# Patient Record
Sex: FEMALE | Race: WHITE
[De-identification: names, ages, dates, MRNs, and addresses within clinical notes are randomized per-mention and may not be internally consistent; named-entity substitution may affect disease eponyms.]

---

## 2018-12-10 ENCOUNTER — HOSPITAL ENCOUNTER (EMERGENCY)
Dept: HOSPITAL 25 - UCEAST | Age: 33
Discharge: HOME | End: 2018-12-10
Payer: COMMERCIAL

## 2018-12-10 VITALS — SYSTOLIC BLOOD PRESSURE: 137 MMHG | DIASTOLIC BLOOD PRESSURE: 77 MMHG

## 2018-12-10 DIAGNOSIS — Z88.1: ICD-10-CM

## 2018-12-10 DIAGNOSIS — B96.20: ICD-10-CM

## 2018-12-10 DIAGNOSIS — N39.0: Primary | ICD-10-CM

## 2018-12-10 PROCEDURE — 81003 URINALYSIS AUTO W/O SCOPE: CPT

## 2018-12-10 PROCEDURE — G0463 HOSPITAL OUTPT CLINIC VISIT: HCPCS

## 2018-12-10 PROCEDURE — 87077 CULTURE AEROBIC IDENTIFY: CPT

## 2018-12-10 PROCEDURE — 87186 SC STD MICRODIL/AGAR DIL: CPT

## 2018-12-10 PROCEDURE — 84702 CHORIONIC GONADOTROPIN TEST: CPT

## 2018-12-10 PROCEDURE — 74176 CT ABD & PELVIS W/O CONTRAST: CPT

## 2018-12-10 PROCEDURE — 87086 URINE CULTURE/COLONY COUNT: CPT

## 2018-12-10 PROCEDURE — 99212 OFFICE O/P EST SF 10 MIN: CPT

## 2018-12-10 NOTE — UC
Abdominal Pain Female HPI





- HPI Summary


HPI Summary: 





33 f with c/o abdominal pain, painful urination, buring with urination, L lower 

back pain x 2-3 days.  no fever, chills.  1 year of frequent urination, x 10  a 

night.   h/o catheterizatins as a child- age 4, unsure for what reason.   no 

recent UTIs/ bladder/ kidney problems.  + hematuria x 1  





- History of Current Complaint


Chief Complaint: UCGeneralIllness


Stated Complaint: URINARY COMPLAINT


Time Seen by Provider: 12/10/18 08:22


Hx Obtained From: Patient


Hx Last Menstrual Period: ended 12/4/18


Pregnant?: No


Onset/Duration: Sudden Onset, Lasting Days


Timing: Constant


Severity Initially: Moderate


Severity Currently: Moderate


Pain Intensity: 5


Pain Scale Used: 0-10 Numeric


Location: Diffuse - lower abdomen


Allergies/Adverse Reactions: 


 Allergies











Allergy/AdvReac Type Severity Reaction Status Date / Time


 


clarithromycin [From Biaxin] AdvReac  Abdominal Verified 12/10/18 08:42





   Pain  














PMH/Surg Hx/FS Hx/Imm Hx


Previously Healthy: Yes - ? catheterizations as a child 





- Surgical History


Surgical History: Yes


Surgery Procedure, Year, and Place: LEEP





- Family History


Known Family History: Positive: Cardiac Disease





- Social History


Alcohol Use: Occasionally


Substance Use Type: None


Smoking Status (MU): Never Smoked Tobacco


Have You Smoked in the Last Year: No





- Immunization History


Most Recent Tetanus Shot: UTD





Review of Systems


All Other Systems Reviewed And Are Negative: Yes


Constitutional: Positive: Fatigue


Genitourinary: Positive: Dysuria, Hematuria, Frequency, Urgency, Vaginal/Penile 

Burning





Physical Exam


Triage Information Reviewed: Yes


Appearance: Well-Appearing, No Pain Distress, Well-Nourished


Vital Signs: 


 Initial Vital Signs











Temp  98.7 F   12/10/18 08:38


 


Pulse  78   12/10/18 08:38


 


Resp  21   12/10/18 08:38


 


BP  137/77   12/10/18 08:38


 


Pulse Ox  98   12/10/18 08:38











Vital Signs Reviewed: Yes


Eyes: Positive: Conjunctiva Clear


Abdomen Description: Negative: CVA Tenderness (R), CVA Tenderness (L)


Musculoskeletal: Positive: Strength Intact, ROM Intact


Neurological Exam: Normal


Psychological Exam: Normal


Skin Exam: Normal





Abd Pain Female Course/Dx





- Course


Course Of Treatment: CT- neg for stone, UT + neg pregnancy, abx given.  follow 

up with PCP  , symptoms improved after NSAIDs, pyrdium





- Differential Dx/Diagnosis


Provider Diagnosis: 


 UTI (urinary tract infection)








Discharge





- Sign-Out/Discharge


Documenting (check all that apply): Patient Departure


All imaging exams completed and their final reports reviewed: Yes





- Discharge Plan


Condition: Good


Disposition: HOME


Prescriptions: 


Ciprofloxacin TAB* [Cipro 500 MG TAB*] 500 mg PO BID #10 tab


Phenazopyridine TAB* [Pyridium 100 mg TAB*] 100 mg PO TID PRN #15 tab


 PRN Reason: urinary pain, spasm 


Patient Education Materials:  Urinary Tract Infection in Women (DC)


Forms:  *Work Release


Referrals: 


Doretha Alex MD [Primary Care Provider] - 


Additional Instructions: 


- Increase fluid intake 


- Pyridium for pain 


- Ibuprofen for pain 


- Go to ER with Back pain, fever 


- Bactrim for antibiotics for 5 days 


- Follow up with Dr Alex for sytmpoms, repeat urine testing in 1-2 weeks 








- Billing Disposition and Condition


Condition: GOOD


Disposition: Home

## 2018-12-12 NOTE — UC
- Progress Note


Progress Note: 





12/12/2018 


Urine culture positive for E.coli.


Pt Rx Ciprofloxacin PO


Final reports shows sensitivity to Ciprofloxacin PO


No change


Isi Christian PA-C  





Course/Dx





- Diagnoses


Provider Diagnoses: 


 UTI (urinary tract infection)








Discharge





- Sign-Out/Discharge


Documenting (check all that apply): Patient Departure - d/c home


All imaging exams completed and their final reports reviewed: Yes





- Discharge Plan


Condition: Good


Disposition: HOME


Prescriptions: 


Ciprofloxacin TAB* [Cipro 500 MG TAB*] 500 mg PO BID #10 tab


Phenazopyridine TAB* [Pyridium 100 mg TAB*] 100 mg PO TID PRN #15 tab


 PRN Reason: urinary pain, spasm 


Patient Education Materials:  Urinary Tract Infection in Women (DC)


Forms:  *Work Release


Referrals: 


Doretha Alex MD [Primary Care Provider] - 


Additional Instructions: 


- Increase fluid intake 


- Pyridium for pain 


- Ibuprofen for pain 


- Go to ER with Back pain, fever 


- Bactrim for antibiotics for 5 days 


- Follow up with Dr Alex for sytmpoms, repeat urine testing in 1-2 weeks 








- Billing Disposition and Condition


Condition: GOOD


Disposition: Home

## 2019-07-17 ENCOUNTER — HOSPITAL ENCOUNTER (EMERGENCY)
Dept: HOSPITAL 25 - UCCORT | Age: 34
Discharge: HOME | End: 2019-07-17
Payer: COMMERCIAL

## 2019-07-17 VITALS — DIASTOLIC BLOOD PRESSURE: 73 MMHG | SYSTOLIC BLOOD PRESSURE: 134 MMHG

## 2019-07-17 DIAGNOSIS — M54.16: Primary | ICD-10-CM

## 2019-07-17 PROCEDURE — 81003 URINALYSIS AUTO W/O SCOPE: CPT

## 2019-07-17 PROCEDURE — 96372 THER/PROPH/DIAG INJ SC/IM: CPT

## 2019-07-17 PROCEDURE — G0463 HOSPITAL OUTPT CLINIC VISIT: HCPCS

## 2019-07-17 PROCEDURE — 99211 OFF/OP EST MAY X REQ PHY/QHP: CPT

## 2019-07-17 NOTE — UC
Back Pain HPI





- HPI Summary


HPI Summary: 


34-year-old woman comes in with a chief complaint of low back pain.  She's been 

having chronic back pain for several months.  The worst in her low back and 

radiates around the left towards the left upper leg.  She is being evaluated by 

her primary care physician and she just had an MRI on July 12, 2019.  At this 

stage there looking at pain clinic and neurosurgical referral.  Pain is worse 

with twisting turning bending.  Patient this morning took ibuprofen and 

acetaminophen and tramadol with minimal relief of the pain.  Usually the same 

medicines to help with the pain.  Denies any burning with urination or feeling 

sick with fevers.  Ever since the back pain started patient's noticed that she 

has to urinate more frequently and has been more difficult time controlling the 

urine.  Patient has seen a specialist entertaining the idea of interstitial 

cystitis. No difficulty controlling stool.  She does describe when the pains is 

worse she gets a numb feeling going from her back to her left upper leg.





- History of Current Complaint


Chief Complaint: UCBackPain


Stated Complaint: BACK PAIN


Time Seen by Provider: 07/17/19 15:22


Hx Last Menstrual Period: 7/16/19


Pain Intensity: 9





- Allergies/Home Medications


Allergies/Adverse Reactions: 


 Allergies











Allergy/AdvReac Type Severity Reaction Status Date / Time


 


clarithromycin [From Biaxin] AdvReac  Abdominal Verified 07/17/19 15:39





   Pain  











Home Medications: 


 Home Medications





Acetaminophen [Tylenol Extra Strength] 1,000 mg PO DAILY 07/17/19 [History 

Confirmed 07/17/19]


Cholecalciferol (Vitamin D3) [Vitamin D3] 1,000 unit PO DAILY 07/17/19 [History 

Confirmed 07/17/19]


Cyanocobalamin TAB* [Vitamin B12 TAB*] 500 mcg PO DAILY 07/17/19 [History 

Confirmed 07/17/19]


LORazepam TAB(*) [Ativan 0.5 MG TAB (*)] 0.5 mg PO BEDTIME PRN 07/17/19 [

History Confirmed 07/17/19]


Lactobacillus Acidophilus [Probiotic] 1 each PO DAILY 07/17/19 [History 

Confirmed 07/17/19]


Uribel BID PRN 07/17/19 [History]


traMADol TAB* [Ultram*] 50 mg PO Q6HR PRN 07/17/19 [History Confirmed 07/17/19]











PMH/Surg Hx/FS Hx/Imm Hx


Previously Healthy: Yes





- Surgical History


Surgical History: Yes


Surgery Procedure, Year, and Place: LEEP;





- Family History


Known Family History: Positive: Cardiac Disease





- Social History


Alcohol Use: Occasionally


Substance Use Type: None


Smoking Status (MU): Never Smoked Tobacco


Have You Smoked in the Last Year: No





- Immunization History


Most Recent Tetanus Shot: UTD





Review of Systems


All Other Systems Reviewed And Are Negative: Yes


Constitutional: Positive: Negative


Skin: Positive: Negative


Eyes: Positive: Negative


ENT: Positive: Negative


Respiratory: Positive: Negative


Cardiovascular: Positive: Negative


Gastrointestinal: Positive: Negative


Genitourinary: Positive: Frequency


Motor: Positive: Other - SEE HPI


Neurovascular: Positive: Other - SEE HPI


Musculoskeletal: Positive: Other: - SEE HPI


Neurological: Positive: Other - SEE HPI


Psychological: Positive: Negative


Is Patient Immunocompromised?: No





Physical Exam


Triage Information Reviewed: Yes


Appearance: Well-Appearing, Well-Nourished, Pain Distress - MILD; WORSE WITH ROM


Vital Signs: 


 Initial Vital Signs











Temp  100.2 F   07/17/19 15:22


 


Pulse  70   07/17/19 15:22


 


Resp  16   07/17/19 15:22


 


BP  134/73   07/17/19 15:22


 


Pulse Ox  100   07/17/19 15:22











Vital Signs Reviewed: Yes


Eye Exam: Normal


Eyes: Positive: Conjunctiva Clear


Neck: Positive: Supple


Respiratory: Positive: No respiratory distress


Musculoskeletal: Positive: Strength Intact, ROM Intact


Neurological: Positive: Alert, Muscle Tone Normal


Psychological: Positive: Normal Response To Family, Age Appropriate Behavior


Skin Exam: Normal





Back Pain Course/Dx





- Course


Course Of Treatment: 


In clinic patient received Toradol 60 mg IM and Norco 5 mg by mouth.  Plan will 

be to follow-up with her primary care doctor.  I let her know that if she had 

any weakness or numbness or difficulty controlling urine or bowel it's changing 

immediately evaluated again right away.  Temperature here was 100.2.  only 

trace blood in the urine no signs of urinary tract infection otherwise.  No 

other signs of infection.  Patient feels well.





- Differential Dx/Diagnosis


Provider Diagnosis: 


 Low back pain, Lumbar radiculopathy








Discharge





- Sign-Out/Discharge


Documenting (check all that apply): Patient Departure


All imaging exams completed and their final reports reviewed: No Studies





- Discharge Plan


Condition: Stable


Disposition: HOME


Prescriptions: 


HYDROcodone/ACETAMIN 5-325 MG* [Norco 5-325 TAB*] 1 tab PO Q4H PRN #20 tab MDD 6


 PRN Reason: Pain


methylPREDNISolone [Medrol Dosepak 4 MG*] 0 mg PO .[place] [employer] #1 [place]


Patient Education Materials:  Lower Back Exercises (ED), Acute Low Back Pain (ED

), Lumbar Radiculopathy (ED)


Referrals: 


Doretha Alex MD [Primary Care Provider] - 


[facility] [Provider Group]


Chris Ding MD [Medical Doctor] - 


Additional Instructions: 


FOLLOW UP WITH YOUR DOCTOR.


FOLLOW UP WITH YOUR PRIMARY CARE DOCTOR, SPORTS MEDICINE, PAIN MEDICINE AND/OR 

NEUROSURGERY.


GET REEVALUATED SOONER IF WORSE OR ANY QUESTIONS OR CONCERNS.








- Billing Disposition and Condition


Condition: STABLE


Disposition: Home

## 2019-07-17 NOTE — XMS REPORT
Continuity of Care Document (CCD)

 Created on:2019



Patient:Cheri Lovell

Sex:Female

:1985

External Reference #:[id number]





Demographics







 Address  [address] [address]

 

 Mobile Phone  [phone]

 

 Email Address  [email or web address]

 

 Preferred Language  en

 

 Marital Status  Not  or 

 

 Taoist Affiliation  Unknown

 

 Race  White

 

 Ethnic Group  Not  or 









Author







 Name  Destini Rivero NP

 

 Address  [address]



   Unavailable



   [place] [address]









Support







 Name  Relationship  Address  Phone

 

 Tien Shoemaker  Significant Other  Unavailable  [phone]









Care Team Providers







 Name  Role  Phone

 

 Doretha Alex M.D.  Care Team Information   Unavailable

 

 Doretha Alex M.D.  Primary Care Physician  Unavailable









Payers







 Date  Identification Numbers  Payment Provider  Subscriber

 

 Effective: 2014  Policy Number: [id number]  [facility]  Cheri [facility]ell









 Group Number: [id number]  [id number]

 

 PayID: [id number]  [place] [address]







Problems







 Active Problems  Provider  Date

 

 Vitamin D deficiency  Anastasiya Morin M.D.  Onset: 2012

 

 Attention deficit hyperactivity disorder,  David Hills M.D.  Onset: 



 predominantly inattentive type    

 

 Mild recurrent major depression  David Hills M.D.  Onset: 2012

 

 Erythroleukoplakia of internal part of  Morales Hernandez M.D.  Onset: 2016



 mouth    

 

 Recurrent major depressive episodes  Doretha Alex M.D.  Onset: 2018

 

 Chronic interstitial cystitis  Doretha Alex M.D.  Onset: 2019









 Resolved Problems









 Chest pain  Miracle Meyer M.D.  Onset: 2012









 Resolved: 2016









 Adult health examination  Anastasiya Morin M.D.  Onset: 2012









 Resolved: 2016









 Abdominal pain  Anastasiya Morin M.D.  Onset: 2012









 Resolved: 2016









 Amnesia  Anastasiya Morin M.D.  Onset: 2012









 Resolved: 2016







Family History







 Date  Family Member(s)  Observation  Comments

 

 :  (age 66  Father   due to MI  



 Years)      

 

   Father  MI age 56, HTN, hyperlidipemia,  



     mouth cancer  

 

   Mother  Anxiety  

 

   Mother  anxiety, depression,cervical  



     cancer leading her  



     hysterectomy,  

 

   First Son  asthma  

 

   First Sister  Anxiety  

 

   First Sister  twin sister - interstitial  



     cystitis  

 

   Paternal Grandfather   prostate cancer  

 

   Paternal Grandmother  Parkinson's  

 

   Maternal Grandfather   prostate cancer  

 

   Maternal Grandmother  Heart disease  







Social History







 Type  Date  Description  Comments

 

 Birth Sex    Unknown  

 

 Education    Highest level of  



     education completed is  



     a bachelor's degree  

 

 Living Situation    Lives with spouse, son  



     and daughter  

 

 Diet    Diet is healthy and  



     well balanced  

 

 Pets    Household pets include  



     2 cats  

 

 Occupation    Construction   -



       [employer]



       buildling

 

 Tobacco Use  Start: Unknown  Never Smoked Cigarettes  

 

 ETOH Use    Social Alcohol  a couple of beers a



       week.

 

 Tobacco Use  Start: Unknown  Patient has never  



     smoked  

 

 Smoking Status  Reviewed: 19  Patient has never  



     smoked  

 

 Exercise    Exercises regularly,  



 Type/Frequency Current    walks 2 miles every day  



      - 30 minutes.  







Allergies, Adverse Reactions, Alerts







 Active Allergies  Reaction  Severity  Comments  Date

 

 Biaxin  severe GI upset      2014









 Inactive Allergies









 codeine      gi upset  2009







Medications







 Active Medications  SIG  Qnty  Indications  Ordering  Date



         Provider  

 

 Cyclobenzaprine HCL  take 1-2  60tabs    Destini Chandni  2019



                5mg  tablets by      DEBORA Rivero  



 Tablets  mouth at night        



   as needed for        



   back spasm        

 

 Tramadol HCL  1 by mouth  28tabs    Destini Chandni  2019



         50mg Tablets  every 6 hours      DEBORA Rivero  



   as needed        

 

 Lorazepam  take 1/2 - 1  14tabs  F33.9  [facility],  2018



      0.5mg Tablets  pill by mouth      M.D.  



   up to once        



   daily as needed        



   for anxiety        

 

 Sertraline HCL  take two  60tabs  F41.9  [facility],  2015



           100mg Tablets  tablets by      M.D.  



   mouth every day        

 

 Ortho-Cyclen (28)  1 by mouth      Unknown  



   every day        



 0.25-35mg-mcg Tablets          



           

 

 Vitamin D  1 by mouth      Unknown  



      2000Unit Capsules  twice daily        



           

 

 Vitamin B 12        Unknown  



         100mcg Lozenges          



           

 

 Probiotic  1 by mouth      Unknown  



       Capsules  every day        



           

 

 Uribel  one daily by      Unknown  



   118mg Capsules  mouth as needed        



           









 History Medications









 Medrol  take dose pack as  1units    Destini Tariq  2019 -



       4mg TBPK  directed      DEBORA Rivero  2019



           

 

 Tamiflu  1 tab by mouth  10caps    Doretha Isai  2019 -



        75mg Capsules  every day x10 days      M.D.  2019



           

 

 Proair HFA  take 1-2 puffs  51gm  J20.9  Brook HOFFMAN  2018 -



   prior to exercise      DEBORA Ponce  2018



 108(90Base) mcg/Act  and as needed for        



 Aerosol  wheezing and        



   difficulty        



   breathing        

 

 Azithromycin  take two by mouth  6tabs  J20.9  Brook HOFFMAN  2018 -



             250mg  as first dose,      DEBORA Ponce  2018



 Tablets  then take one by        



   mouth daily until        



   gone        

 

 Acetaminophen-Codein  take one by mouth  20tabs  J20.9  Brook HOFFMAN  2018 -



 e #3  every 6 hours as      DEBORA Ponce  2018



     300-30mg Tablets  needed for cough.        



   may take 2 as one        



   dose at bedtime.        

 

 Acetaminophen-Codein  take one by mouth  15tabs  J20.9  Broko HOFFMAN  2017 -



 e #3  every 6 hours as      DEBORA Ponce  2018



     300-30mg Tablets  needed for cough.        



   may take 2 as one        



   dose at bedtime.        

 

 Prednisone  take 2 by mouth as  8tabs  J20.9  Brook HOFFMAN  2017 -



           20mg  one dose daily      DEBORA Ponce  2018



 Tablets  until gone        



           

 

 Thumb Spica Splint  For left hand Use  1units    Brook HOFFMAN  2017 -



   during the day,      DEBORA Ponce  2017



   remove at night        



   for 2-4 weeks.        

 

 Amoxicillin/Clavulan  1 by mouth twice a  20tabs  J20.9  KYRIE Contreras   -



 ate Potassium  day for 1 0days        10/26/2017



           



 875-125mg Tablets          



           

 

 Doxycycline Hyclate  take one by mouth  14caps  J01.00  Brook HOFFMAN  2017 -



   twice daily until      DEBORA Ponce  2017



 100mg Capsules  gone        



           

 

 Mometasone Furoate  2 sprays in each  17gm  J01.00  Brook HOFFMAN  2017 -



   nostril once daily      DEBORA Ponce  2019



 50mcg/Act Suspension          



           

 

 Amoxicillin/Clavulan  1 by mouth twice a  20tabs  J20.9  Myriam HARPER.  2016 
-



 ate Potassium  day for 1 0days      MARKUS Noe  09/10/2017



           



 875-125mg Tablets          



           

 

 Diflucan  1 by mouth now.  4tabs  J20.9  Richa Mackenzie, KYRIE  2016 -



         200mg  repeat in one week        10/26/2017



 Tablets  as necessary.        



           

 

 Physical Therapy  treatment and    M54.5  Susu Barroso,  2016 -



   evaluation of      [facility]  2016



   lower back pain        

 

 Mouthwash/Gargle  equal parts  8Oz  B00.2  Susu Barroso,  2016 -



   viscous lidocaine,      [facility]  2016



 Liquid  maalox and liquid        



   benadryl;  5-10        



   mls swish, gargle        



   and spit q 4 hrs        



   prn for mouth pain        

 

 Valtrex  1 po bid x 4 days  8tabs  B00.2  Susu Barroso,  2016 -



        500mg Tablets        [facility]  2016



           

 

 Doxycycline Hyclate  1 tab twice a day  28tabs  J01.90  Doretha Alex,  2016 -



   x 14 days      M.D.  2016



 100mg Tablets          



           

 

 Fluconazole  one tab by mouth  2tabs  J01.90  Doretha Alex,  2016 -



            150mg  once, may repeat      M.D.  2016



 Tablets  in five days        



           

 

 Amoxicillin/Clavulan  1 by mouth twice a  20tabs    Kashmir Vasquez M.D.  2016 -



 ate Potassium  day for 1 0days        2016



           



 875-125mg Tablets          



           

 

 Work Excuse  pt may return to    V43.62x  Kashmir Vasquez M.D.  12/15/2015 -



   work -.    D    2016



   half time only.        

 

 Work Excuse  please excuse    488.82  Kashmir Vasquez M.D.  2015 -



   -12/9/15        12/15/2015

 

 Work Excuse  please excuse    V43.62x  Kashmir Vasquez M.D.  2015 -



   -12/24/15    D    12/15/2015

 

 Orphenadrine Citrate  1 by mouth twice a  30tabs  V43.62x  Kashmir Vasquez M.D.  2015 -



 ER  day as needed for    D    2016



   100mg Tablets ER  muscle spasm        



 12HR          



           

 

 Naproxen  1 by mouth twice a  30tabs  V43.62x  Kashmir Vasquez M.D.  2015 -



         500mg  day    D    2016



 Tablets          



           

 

 Lorazepam  1/2 to 1 tab by  60tabs  300.00  Lucernemines  2015 -



          1mg Tablets  mouth tid prn      TALI Vázquez  2016



   anxiety, panic        

 

 Cheratussin ac  5 milliliters  50ml  786.2  [facility]  2015 -



   every 12 hours as      Kathie [facility]  2015



 100-10mg/5ML Syrup  needed cough        



           

 

 Tamiflu  1 by mouth twice a  10caps  488.82  Kashmir Vasquez M.D.  2015 -



        75mg Capsules  day for 5 days        2015



           

 

 Work Excuse  please excuse    488.82  Kashmir Vasquez M.D.  2015 -



   -1/30/15        2015

 

 Diflucan  1 po times 1 day,  2tabs    Richa Mackenzie [facility]  08/15/2014 -



         150mg  may repeat in 5-7d        2015



 Tablets          



           

 

 Amoxicillin  1 po tid x 10d  30caps  461.0  Richa Mackenzie [facility]  2014 -



            500mg          2015



 Capsules          



           

 

 Ventolin HFA  2 puffs qd-qid  sample  461.0  Richa Mackenzie [facility]  2014 -



           2015



 108(90Base) mcg/Act          



 Aerosol          



           

 

 Septra DS  1 po bid  10tabs    Morales GOMEZ  2014 -



          800-160mg        MARKUS Hernandez  2014



 Tablets          



           

 

 Vitamin D  1 po qd      [facility]  04/15/2014 -



          [facility]        [facility]  2015



 Tablets        Viktoriya  



           

 

 Zofran  1 po q 12h  30tabs    [facility]  2014 -



       8mg Tablets        [facility]  2014



         Viktoriya  

 

 Prilosec  1 po qd  90caps    Family [facility]  2014 -



         20mg        [facility]  2014



 Capsules DR Sadler  



           

 

 Hydrocodone/Acetamin  1 po qid prn pain  60tabs    [facility]  2014 
-



 ophen        [facility]  2014



      5-325mg Tablets        [facility]  



           

 

 Biaxin  take one tablet po  20tabs  466.0  Betzaida  2014 -



       250mg Tablets  bid x 10 days      Kathie [facility]  04/10/2014



   finish all        



   medication        

 

 Zoloft  2 by mouth every  60tabs  300.00  Kashmir Vasquez M.D.  2012 -



       100mg Tablets  day        2015



           

 

 Amoxicillin/Clavulan  1 po bid  20tabs  461.0  Richa Mackenzie, [facility]  2012 -



 ate Potassium          2012



           



 875-125mg Tablets          



           

 

 Bentyl  1 po every 8 hours  30caps  789.00  Anastasiya Morin,  2012 -



       10mg Capsules  as needed      M.D.  2012



           

 

 Mini [facility]        Family Medicine  2009 -



          Misc        [facility]  2010



         [facility]  

 

 Zoloft  1 po qd  30tabs    [facility]  2009 -



       50mg Tablets        [facility]  2010



         [facility]  

 

 Prenatal  1 po qd      [facility]  2009 -



 Multiviatamin        [facility]  2012



         [facility]  

 

 Expecta  PO qd      [facility]  2009 -



         Capsules        [facility]  2010



         [facility]  

 

 Dha/Epa  1 qd      Unknown   -



         Liquid Gels          2012



           

 

 Zofran  1 po q6h prn  12tabs    Unknown   -



       4mg Tablets          2012



           

 

 Ortho Tri-Cyclen  take as directed      Unknown   -



           2016



 0.18/0.215/0.25 mg-3          



 Tablets          



           

 

 Zoloft  1 po qd  30tabs    Unknown   -



       50mg Tablets          2012



           

 

 Abilify  1 po qd      Unknown   -



        2mg Tablets          2014



           

 

 Strattera  1 po qd      Unknown   -



          100mg          2014



 Capsules          



           

 

 Albuterol  2 puffs every 4      Unknown   -



           Powder  hours prn        2014



           

 

 Vitamin Pack  daily      Unknown   -



           2017

 

 Vesicare  1 by mouth every      Unknown   -



         5mg Tablets  day        2019



           

 

 Myrbetriq  take one tablet by      Unknown   -



          25mg  mouth every        2019



 Tablets ER 24HR  morning and        



   evening        



   (incontinence)        







Vital Signs







 Date  Vital  Result  Comment

 

 2019  1:50pm  BP Systolic  122 mmHg  









 BP Diastolic  64 mmHg  

 

 Heart Rate  68 /min  

 

 Body Temperature  98.2 F  

 

 Respiratory Rate  16 /min  

 

 Height  69 inches  5'9"

 

 Weight  171.00 lb  

 

 BMI (Body Mass Index)  25.2 kg/m2  









 2019 10:37am  BP Systolic  118 mmHg  









 BP Diastolic  68 mmHg  

 

 Heart Rate  68 /min  

 

 Body Temperature  97.7 F  

 

 Respiratory Rate  20 /min  

 

 Weight  169.00 lb  









 2018  9:24am  BP Systolic  120 mmHg  









 BP Diastolic  60 mmHg  

 

 Heart Rate  80 /min  

 

 Body Temperature  98.1 F  

 

 Respiratory Rate  16 /min  

 

 Height  69 inches  5'9"

 

 Weight  167.00 lb  

 

 BMI (Body Mass Index)  24.7 kg/m2  









 2018  3:32pm  BP Systolic  122 mmHg  









 BP Diastolic  70 mmHg  

 

 Heart Rate  68 /min  

 

 Respiratory Rate  16 /min  

 

 Weight  169.00 lb  









 2018  3:33pm  BP Systolic  122 mmHg  









 BP Diastolic  72 mmHg  

 

 Heart Rate  85 /min  

 

 Body Temperature  98.1 F  

 

 O2 % BldC Oximetry  98 %  

 

 Weight  169.00 lb  









 2017  9:09am  BP Systolic  92 mmHg  









 BP Diastolic  50 mmHg  

 

 Heart Rate  84 /min  

 

 Body Temperature  99.0 F  

 

 Height  69 inches  5'9"

 

 Weight  168.25 lb  With Boots

 

 BMI (Body Mass Index)  24.8 kg/m2  









 2017 11:30am  BP Systolic  122 mmHg  









 BP Diastolic  80 mmHg  

 

 Heart Rate  66 /min  

 

 Body Temperature  97.9 F  

 

 Height  69 inches  5'9"

 

 Weight  163.00 lb  

 

 BMI (Body Mass Index)  24.1 kg/m2  









 10/26/2017  3:24pm  BP Systolic  100 mmHg  









 BP Diastolic  70 mmHg  

 

 Heart Rate  68 /min  

 

 Body Temperature  98.1 F  

 

 Respiratory Rate  18 /min  

 

 Height  69 inches  5'9"

 

 Weight  166.00 lb  

 

 BMI (Body Mass Index)  24.5 kg/m2  









 2017  2:58pm  BP Systolic  108 mmHg  









 BP Diastolic  80 mmHg  

 

 Heart Rate  84 /min  

 

 Body Temperature  97.9 F  

 

 Height  69 inches  5'9"

 

 Weight  165.50 lb  

 

 BMI (Body Mass Index)  24.4 kg/m2  









 2016  2:19pm  BP Systolic  112 mmHg  









 BP Diastolic  60 mmHg  

 

 Heart Rate  84 /min  

 

 Body Temperature  98.4 F  

 

 Height  69 inches  5'9"

 

 Weight  156.50 lb  

 

 BMI (Body Mass Index)  23.1 kg/m2  









 2016  4:41pm  BP Systolic  110 mmHg  









 BP Diastolic  62 mmHg  

 

 Heart Rate  84 /min  

 

 Body Temperature  98.9 F  

 

 Height  69 inches  5'9"

 

 Weight  180.38 lb  

 

 BMI (Body Mass Index)  26.6 kg/m2  









 2016 10:05am  BP Systolic  100 mmHg  









 BP Diastolic  60 mmHg  

 

 Heart Rate  72 /min  

 

 Body Temperature  97.9 F  

 

 Respiratory Rate  16 /min  

 

 Height  69 inches  5'9"

 

 Weight  176.00 lb  

 

 BMI (Body Mass Index)  26.0 kg/m2  









 2016 10:59am  BP Systolic  100 mmHg  









 BP Diastolic  60 mmHg  

 

 Heart Rate  60 /min  

 

 Body Temperature  97.9 F  

 

 Respiratory Rate  16 /min  

 

 Height  69 inches  5'9"

 

 Weight  173.00 lb  

 

 BMI (Body Mass Index)  25.5 kg/m2  









 2016  4:21pm  BP Systolic  110 mmHg  









 BP Diastolic  70 mmHg  

 

 Heart Rate  60 /min  

 

 Body Temperature  98.0 F  

 

 Respiratory Rate  18 /min  

 

 Height  69 inches  5'9"

 

 Weight  173.00 lb  

 

 BMI (Body Mass Index)  25.5 kg/m2  









 2016 11:04am  BP Systolic  120 mmHg  









 BP Diastolic  70 mmHg  

 

 Heart Rate  64 /min  

 

 Body Temperature  98.2 F  

 

 Respiratory Rate  16 /min  

 

 Height  69 inches  5'9"

 

 Weight  175.00 lb  

 

 BMI (Body Mass Index)  25.8 kg/m2  









 2016  1:47pm  BP Systolic  114 mmHg  









 BP Diastolic  56 mmHg  

 

 Heart Rate  72 /min  

 

 Body Temperature  97.7 F  

 

 Respiratory Rate  16 /min  

 

 Height  69 inches  5'9"

 

 Weight  176.00 lb  

 

 BMI (Body Mass Index)  26.0 kg/m2  









 12/15/2015  1:54pm  BP Systolic  120 mmHg  









 BP Diastolic  80 mmHg  

 

 Heart Rate  72 /min  

 

 Body Temperature  98.0 F  

 

 Respiratory Rate  18 /min  

 

 Height  69 inches  5'9"

 

 Weight  176.00 lb  

 

 BMI (Body Mass Index)  26.0 kg/m2  









 2015 12:55pm  BP Systolic  120 mmHg  









 BP Diastolic  70 mmHg  

 

 Heart Rate  74 /min  

 

 Body Temperature  98.1 F  

 

 Respiratory Rate  16 /min  

 

 Height  69 inches  5'9"









 2015  1:57pm  BP Systolic  110 mmHg  









 BP Diastolic  64 mmHg  

 

 Heart Rate  64 /min  

 

 Body Temperature  98.1 F  

 

 Respiratory Rate  16 /min  

 

 Height  69 inches  5'9"

 

 Weight  175.00 lb  

 

 BMI (Body Mass Index)  25.8 kg/m2  









 2015  3:21pm  BP Systolic  104 mmHg  









 BP Diastolic  50 mmHg  

 

 Heart Rate  64 /min  

 

 Respiratory Rate  16 /min  

 

 Height  69 inches  5'9"

 

 Weight  169.00 lb  

 

 BMI (Body Mass Index)  25.0 kg/m2  









 2015  1:47pm  BP Systolic  114 mmHg  









 BP Diastolic  64 mmHg  

 

 Heart Rate  60 /min  

 

 Body Temperature  98.1 F  

 

 Respiratory Rate  16 /min  

 

 Height  69 inches  5'9"

 

 Weight  162.00 lb  

 

 BMI (Body Mass Index)  23.9 kg/m2  









 2015 11:36am  BP Systolic  100 mmHg  









 BP Diastolic  64 mmHg  

 

 Heart Rate  68 /min  

 

 Body Temperature  97.7 F  

 

 Respiratory Rate  16 /min  

 

 Height  69 inches  5'9"

 

 Weight  156.00 lb  

 

 BMI (Body Mass Index)  23.0 kg/m2  









 2015 11:34am  BP Systolic  110 mmHg  









 BP Diastolic  60 mmHg  

 

 Heart Rate  76 /min  

 

 Body Temperature  98.7 F  

 

 Respiratory Rate  16 /min  

 

 Height  69 inches  5'9"

 

 Weight  163.00 lb  

 

 BMI (Body Mass Index)  24.1 kg/m2  









 2014  1:46pm  BP Systolic  122 mmHg  









 BP Diastolic  76 mmHg  

 

 Heart Rate  72 /min  

 

 Body Temperature  98.4 F  

 

 Respiratory Rate  16 /min  

 

 Height  69 inches  5'9"

 

 Weight  157.25 lb  

 

 BMI (Body Mass Index)  23.2 kg/m2  









 2014  4:44pm  BP Systolic  120 mmHg  









 BP Diastolic  62 mmHg  

 

 Heart Rate  72 /min  

 

 Body Temperature  98.6 F  

 

 Respiratory Rate  15 /min  

 

 Height  69 inches  5'9"

 

 Weight  163.00 lb  

 

 BMI (Body Mass Index)  24.1 kg/m2  









 2014 10:05am  BP Systolic  120 mmHg  









 BP Diastolic  70 mmHg  

 

 Heart Rate  64 /min  

 

 Body Temperature  97.2 F  

 

 Respiratory Rate  18 /min  

 

 Height  69 inches  5'9"

 

 Weight  160.00 lb  

 

 BMI (Body Mass Index)  23.6 kg/m2  









 04/15/2014  4:25pm  BP Systolic  108 mmHg  









 BP Diastolic  70 mmHg  

 

 Heart Rate  84 /min  

 

 Body Temperature  97.5 F  

 

 Height  69 inches  5'9"

 

 Weight  151.50 lb  

 

 BMI (Body Mass Index)  22.4 kg/m2  









 04/10/2014  4:17pm  BP Systolic  118 mmHg  









 BP Diastolic  60 mmHg  

 

 Heart Rate  66 /min  

 

 Body Temperature  98.5 F  

 

 Respiratory Rate  16 /min  

 

 Height  69 inches  5'9"









 2014  4:01pm  BP Systolic  146 mmHg  









 BP Diastolic  98 mmHg  

 

 Heart Rate  102 /min  

 

 Body Temperature  97.8 F  

 

 Height  69 inches  5'9"

 

 Weight  152.38 lb  

 

 BMI (Body Mass Index)  22.5 kg/m2  









 2014 10:17am  BP Systolic  110 mmHg  









 BP Diastolic  70 mmHg  

 

 Heart Rate  80 /min  

 

 Body Temperature  98.1 F  

 

 Respiratory Rate  20 /min  

 

 O2 % BldC Oximetry  98 %  

 

 Height  69 inches  5'9"

 

 Weight  152.00 lb  

 

 BMI (Body Mass Index)  22.4 kg/m2  









 05/10/2013  3:52pm  BP Systolic  110 mmHg  









 BP Diastolic  68 mmHg  

 

 Heart Rate  84 /min  

 

 Body Temperature  97.6 F  

 

 Height  69 inches  5'9"

 

 Weight  154.25 lb  

 

 BMI (Body Mass Index)  22.8 kg/m2  









 2012  9:35am  BP Systolic  120 mmHg  









 BP Diastolic  72 mmHg  

 

 Heart Rate  72 /min  

 

 Body Temperature  98.4 F  

 

 Height  69 inches  5'9"

 

 Weight  160.00 lb  

 

 BMI (Body Mass Index)  23.6 kg/m2  









 2012 10:37am  BP Systolic  102 mmHg  









 BP Diastolic  68 mmHg  

 

 Heart Rate  60 /min  

 

 Body Temperature  97.4 F  

 

 Height  69 inches  5'9"

 

 Weight  156.00 lb  

 

 BMI (Body Mass Index)  23.0 kg/m2  









 2012 11:13am  BP Systolic  100 mmHg  









 BP Diastolic  70 mmHg  

 

 Heart Rate  80 /min  

 

 Body Temperature  98.2 F  

 

 Height  69 inches  5'9"

 

 Weight  164.00 lb  

 

 BMI (Body Mass Index)  24.2 kg/m2  









 2012 12:23pm  BP Systolic  98 mmHg  









 BP Diastolic  58 mmHg  

 

 Heart Rate  66 /min  

 

 Body Temperature  98.3 F  

 

 Height  69 inches  5'9"

 

 Weight  165.00 lb  

 

 BMI (Body Mass Index)  24.4 kg/m2  

 

 Right Visual Acuity Distance  20/20  

 

 Left Visual Acuity Distance  20/20  









 2012  3:47pm  BP Systolic  120 mmHg  









 BP Diastolic  70 mmHg  

 

 Heart Rate  68 /min  

 

 Body Temperature  97.8 F  

 

 Height  69 inches  5'9"

 

 Weight  165.00 lb  

 

 BMI (Body Mass Index)  24.4 kg/m2  









 2010  2:57pm  BP Systolic  120 mmHg  









 BP Diastolic  60 mmHg  

 

 Heart Rate  80 /min  

 

 Body Temperature  98.5 F  

 

 Respiratory Rate  20 /min  

 

 Weight  168.00 lb  









 2010  2:49pm  BP Systolic  112 mmHg  









 BP Diastolic  58 mmHg  

 

 Heart Rate  80 /min  

 

 Body Temperature  99.3 F  

 

 Respiratory Rate  12 /min  

 

 Weight  110.00 lb  







Results







 Test  Date  Facility  Test  Result  H/L  Range  Note

 

 Ua - Micro (a)  2018  [facility]  Appearance  clear      



     (607)-   -          









 Color  yellow      

 

 Glucose, Urine (Fma/[facility]/CTX)  negative      

 

 Bilirubin  negative      

 

 Ketones  trace  #    

 

 SP Grav  1.020      

 

 Blood  negative      

 

 PH  5.5      

 

 Protein  negative      

 

 Urobil  0.2      

 

 Nitrite  negative      

 

 Leukocytes (a/[facility])  small  #    

 

 Hyaline  - /Lpf  #    

 

 Granular  - /Lpf  #    

 

 WBC (a,[facility])  8-10  #    

 

 RBC  0-1  #    

 

 Mucus  - /Lpf  #    

 

 Epith  few /Lpf  #    

 

 Bacteria  3+ /Hpf  #    

 

 Amorphous  - /Lpf  #    

 

 Crystals, Fluid (a/CMC/CTX)  -  #    









 Urine Culture And  2018  [facility]  Urine Culture  SEE RESULT BELOW      1, 2



 Sensitivities              

 

 Poc Urinalysis  12/10/2018  [facility]  Poc Glucose,  Negative    Negative  



       Urine        









 Poc Bilirubin, Urine  Negative    Negative  

 

 Poc Ketone, Urine  Negative    Negative  

 

 Poc Specific Gravity, Urine  1.020  N  1.010-1.030  

 

 Poc Blood, Urine  3+  Abnormal  Negative  

 

 Poc pH, Urine  7.0  N  5-9  

 

 Poc Protein, Urine  1+  Abnormal  Negative  

 

 Poc Urobilinogen, Urine  0.2    Negative  

 

 Poc Nitrite, Urine  Positive  Abnormal  Negative  

 

 Poc Leukocytes, Urine  3+  Abnormal  Negative  

 

 Poc Color, Urine  Dark yellow      

 

 Poc Clarity, Urine  Cloudy      3









 Urine Culture And  12/10/2018  [facility]  Urine  SEE RESULT      4, 5



 Sensitivities      Culture  BELOW      

 

 Laboratory test  12/10/2018  [facility]  Poc  Negative    Negative  6



 finding      Pregnancy,        



       Urine        

 

 CBC Electronic a  10/08/2018  [facility] [facility])  WBC  4.7 x10^3/UL    4.0-
10.0  









 RBC  4.39 x10^6/UL    3.93-6.00  

 

 HGB  13.5 g/dL    12.0-17.0  

 

 HCT  40 %    35-50  

 

 MCV  90.0 fL    80.0-95.0  

 

 MCH  30.8 pg    25.6-32.2  

 

 MCHC  34.2 g/dL    32.2-36.0  

 

 RDW-CV  12.0 %    11.6-14.4  

 

 PLT  201 x10^3/UL    163-400  

 

 MPV  9.3 fL  Low  9.4-12.4  

 

 Mark#  2.73 x10^3/UL    1.56-6.13  

 

 Lymph#  1.52 x10^3/UL    1.18-3.74  

 

 Mono#  0.32 x10^3/UL    0.24-0.82  

 

 Eos #  0.1 x10^3/UL    0.0-0.5  

 

 Baso #  0.03 x10^3/UL    0.01-0.08  

 

 Mark%  57.6 %    34.0-70.0  

 

 Lymph %  32.1 %    20.0-52.0  

 

 Mono%  6.8 %    5.0-12.0  

 

 Eos%  2.7 %    0.7-7.0  

 

 Baso%  0.6 %    0.1-1.2  









 Lipid Profile  10/08/2018  Suresh Priya(fma)  Cholesterol  196 mg/dL    120-
200  









 Triglycerides  159 mg/dL      

 

 HDL Cholesterol  61 mg/dL    30-85  

 

 LDL (Calculated)  103 CALC    0-129  

 

 VLDL Cholesterol  32 mg/dL    0-50  

 

 HDL Risk Factor  3.2 CALC    0.0-4.4  









 Comprehensive Metabolic  10/08/2018  Suresh Priya(fma)  Sodium  136 mEq/L    
134-149  



 Prof              









 Potassium  5.0 mEq/L    3.6-5.5  

 

 Chloride  102 mEq/L      

 

 Carbon Dioxide  26 mEq/L    21-32  

 

 Glucose  90 mg/dL      

 

 BUN  15 mg/dL    6-26  

 

 Creatinine  1.1 mg/dL    0.6-1.4  

 

 BUN/Creat Ratio  13.6 CALC    8.0-36.0  

 

 Calcium  9.8 mg/dL    8.6-10.2  

 

 Total Protein  7.0 g/dL    6.4-8.3  

 

 Albumin  4.9 g/dL    3.8-5.5  

 

 Globulin  2.1 g/dL    2.0-4.8  

 

 A/G Ratio  2.3 CALC    0.6-2.3  

 

 Alk. Phosphatase  46 U/L      

 

 Alt (SGPT)  18 U/L    7-35  

 

 Ast (Sgot)  22 U/L    5-34  

 

 Total Bilirubin  0.4 mg/dL    0.2-1.3  

 

 GFR Non-African American  60 ml/min/1.73m^    >=60  

 

 GFR African American  >60 ml/min/1.73m^    >=60  









 GC/Chlamydia Amplified  2018  [facility]  Chlamydia trachomatis  Negative    
Negative  



 Rna      Rna        









 Neisseria gonorrhoeae (GC) Rna  Negative    Negative  









 Comprehensive Metabolic  2016  Prince Priya(fma)  Sodium  142 mEq/L    
134-149  



 Prof              









 Potassium  4.5 mEq/L    3.6-5.5  

 

 Chloride  99 mEq/L      

 

 Carbon Dioxide  30 mEq/L    21-32  

 

 Glucose  83 mg/dL      

 

 BUN  18 mg/dL    6-26  

 

 Creatinine  0.8 mg/dL    0.6-1.4  

 

 BUN/Creat Ratio  22.5 CALC    8.0-36.0  

 

 Calcium  9.5 mg/dL    8.6-10.2  

 

 Total Protein  7.3 g/dL    6.4-8.3  

 

 Albumin  4.4 g/dL    3.8-5.5  

 

 Globulin  2.9 g/dL    2.0-4.8  

 

 A/G Ratio  1.5 CALC    0.6-2.3  

 

 Alk. Phosphatase  58 U/L      

 

 Alt (SGPT)  22 U/L    7-35  

 

 Ast (Sgot)  26 U/L    5-34  

 

 Total Bilirubin  0.3 mg/dL    0.2-1.3  

 

 GFR Non-African American  >60 ml/min/1.73m^    >=60  

 

 GFR African American  >60 ml/min/1.73m^    >=60  









 HIV 1/O/2 Ag/AB  2016  [facility]  HIV Screen 4th  Non Reactive    Non 
Reactive  7



 Prelim    [address] [facility] [address]        



 [address] [place] [address]          



 Sup    (607)-   -          

 

 Laboratory test  2016  Prince Priya(fma)  Ferritin  15 ng/mL    6-115  



 finding              









 Vitamin D25  57      

 

 Vitamin B-12  427 pg/mL    230-1050  

 

 Folate Level  >20.00 ng/mL  High  3.00-16.00  









 Complete Blood Count  2016  Prince Priya(fma)  WBC  7.4 x10^3/UL    3.6
-9.6  









 RBC  4.24 x10^6/UL    3.90-5.70  

 

 HGB  13.5 g/dL    12.1-17.2  

 

 HCT  40 %    36-50  

 

 MCV  95.0 fL    82.2-97.4  

 

 MCH  31.9 pg    27.6-33.3  

 

 MCHC  33.6 g/dL    33.0-35.5  

 

 RDW  12.4 %    11.6-13.7  

 

 PLT  222 x10^3/UL    150-400  

 

 MPV  7.4 fL    7.4-10.4  

 

 Gran #  5.5 x10^3/UL    1.5-7.2  

 

 Lymph#  1.7 x10^3/UL    0.7-4.9  

 

 Mono#  0.2 x10^3/UL    0.1-0.9  

 

 Gran %  72.8 %    42.2-75.2  

 

 Lymph %  23.4 %    20.5-51.1  

 

 Mono%  3.8 %    1.7-9.3  









 Complete Blood Count  2016  Suresh Singh([facility])  WBC  5.8 x10^3/UL    3.6
-9.6  









 RBC  4.14 x10^6/UL    3.90-5.70  

 

 HGB  13.4 g/dL    12.1-17.2  

 

 HCT  39 %    36-50  

 

 MCV  93.0 fL    82.2-97.4  

 

 MCH  32.2 pg    27.6-33.3  

 

 MCHC  34.5 g/dL    33.0-35.5  

 

 RDW  12.3 %    11.6-13.7  

 

 PLT  213 x10^3/UL    150-400  

 

 MPV  7.4 fL    7.4-10.4  

 

 Gran #  4.1 x10^3/UL    1.5-7.2  

 

 Lymph#  1.5 x10^3/UL    0.7-4.9  

 

 Mono#  0.2 x10^3/UL    0.1-0.9  

 

 Gran %  68.9 %    42.2-75.2  

 

 Lymph %  27.2 %    20.5-51.1  

 

 Mono%  3.9 %    1.7-9.3  









 Comprehensive Metabolic  2016  Suresh Singh(a)  Sodium  139 mEq/L    
134-149  



 Prof              









 Potassium  4.7 mEq/L    3.6-5.5  

 

 Chloride  102 mEq/L      

 

 Carbon Dioxide  28 mEq/L    21-32  

 

 Glucose  93 mg/dL      

 

 BUN  12 mg/dL    6-26  

 

 Creatinine  0.8 mg/dL    0.6-1.4  

 

 BUN/Creat Ratio  15.0 CALC    8.0-36.0  

 

 Calcium  9.6 mg/dL    8.6-10.2  

 

 Total Protein  6.6 g/dL    6.4-8.3  

 

 Albumin  3.9 g/dL    3.8-5.5  

 

 Globulin  2.7 g/dL    2.0-4.8  

 

 A/G Ratio  1.4 CALC    0.6-2.3  

 

 Alk. Phosphatase  57 U/L      

 

 Alt (SGPT)  14 U/L    7-35  

 

 Ast (Sgot)  18 U/L    5-34  

 

 Total Bilirubin  0.2 mg/dL    0.2-1.3  

 

 GFR Non-African American  >60 ml/min/1.73m^    >=60  

 

 GFR African American  >60 ml/min/1.73m^    >=60  









 Laboratory test  2016  Prince Priya(a)  TSH  1.02 mIU/L    0.50-6.00
  



 finding              

 

 Influenza A&B-[facility]  2015  [facility]  Influenza A  POS  #    



     (607)-   -          









 Influenza B  NEG      









 Ua - Micro ([facility])  2014  [facility]  Appearance  SLT CLOUDY      



     (607)-   -          









 Color  ORANGE      

 

 Glucose  NEG      

 

 Bilirubin  NEG      

 

 Ketones  NEG      

 

 SP Grav  1.020      

 

 Blood  TRACE-INTACT  #    

 

 PH  7.0      

 

 Protein  NEG      

 

 Urobil  0.2      

 

 Nitrite  NEG      

 

 Leukocytes (a/[facility])  MOD  #    

 

 Hyaline  - /Lpf      

 

 Granular  - /Lpf      

 

 WBC ([facility])  >100  #    

 

 RBC  15-18  #    

 

 Mucus  - /Lpf      

 

 Epith  FEW /Lpf  #    

 

 Bacteria  1-2+ /Hpf  #    

 

 Amorphous  - /Lpf      

 

 Crystals, Fluid ([facility])  -      

 

 Z#Comments  -      









 Surgical Pathology  2014  [facility]  S  RUN DATE:      / <SEE      



         NOTE>      

 

 Clotest  2014  [facility]  Clotest  (SEE NOTE)      9

 

 Laboratory test  2014  Prince Priya(a)  Amylase, Serum  102 U/L    20
-105  



 finding              

 

 Comprehensive  2014  Prince Priya(a)  Sodium  141 mEq/L    134-149  



 Metabolic Prof              









 Potassium  5.2 mEq/L    3.6-5.5  

 

 Chloride  102 mEq/L      

 

 Carbon Dioxide  27 mEq/L    21-32  

 

 Glucose  90 mg/dL      

 

 BUN  15 mg/dL    6-26  

 

 Creatinine  1.0 mg/dL    0.6-1.4  

 

 BUN/Creat Ratio  15.0 CALC    8.0-36.0  

 

 Calcium  10.2 mg/dL    8.6-10.2  

 

 Total Protein  7.6 g/dL    6.3-8.1  

 

 Albumin  5.1 g/dL    3.8-5.5  

 

 Globulin  2.5 g/dL    2.0-4.8  

 

 A/G Ratio  2.0 CALC    0.6-2.3  

 

 Alk. Phosphatase  80 U/L      

 

 Alt (SGPT)  20 U/L    7-35  

 

 Ast (Sgot)  21 U/L    5-34  

 

 Total Bilirubin  0.3 mg/dL    0.2-1.3  









 Laboratory test  2014  [facility]  C-Reactive  4.0 mg/L    0.0-5.0  10



 finding    [address]  Protein        



     [place] [address]          



     [phone]          

 

 CBC Electronic  2014  [facility]  WBC  9.1    3.6-9.6  



 ([facility])    (607)-   -          









 RBC  4.64    3.90-5.70  

 

 Hemoglobin ([facility])  14.6 g/dL    12.1 - 17.2  

 

 Hematocrit ([facility])  43.0 %    36.1 - 50.3  

 

 Platelets  341 10^3/ul    150-400  

 

 Lymph%  26.0 %    17.0-48.0  

 

 Mixed%  5.2      

 

 Neutrophils %  68.8      

 

 Mean Corpuscular Vol  93    82.2-97.4  

 

 Mean Corpuscular Hemoglobin  31.6    27.6-33.3  

 

 Mean Corpuscular Hemo Concen  34.0    32.0-36.0  

 

 RDW  10.6  Low  11.6-13.7  

 

 Mean Platelet Volume  6.1    5.5-11.0  









 Ua - Micro ([facility]ma)  2014  [facility]  Appearance  cloudy      



     (607)-   -          









 Color  yellow      

 

 Glucose  -      

 

 Bilirubin  -      

 

 Ketones  -      

 

 SP Grav  1.020      

 

 Blood  trace-intact  #    

 

 PH  7.5      

 

 Protein  -      

 

 Urobil  1.0      

 

 Nitrite  -      

 

 Leukocytes ([facility])  trace  #    

 

 Hyaline  - /Lpf      

 

 Granular  - /Lpf      

 

 WBC ([facility])  2-3      

 

 RBC  0-1      

 

 Mucus  - /Lpf      

 

 Epith  few /Lpf      

 

 Bacteria  trace /Hpf      

 

 Amorphous  - /Lpf      

 

 Crystals, Fluid ([facility])  -      

 

 Z#Comments  -      









 Urine Pregnancy (Fma)  2014  [facility]  SP Grav  1.020      



     (607)-   -          









 Urine, Pregnancy ([facility])  neg      









 CBC Auto Diff  2014  [facility]  White Blood Count  9.5 10^3/uL    4.8-10.8  









 Red Blood Count  4.58 10^6/uL    4.0-5.4  

 

 Hemoglobin  14.5 g/dL    12.0-16.0  

 

 Hematocrit  41 %    35-47  

 

 Mean Corpuscular Volume  90 fL    80-97  

 

 Mean Corpuscular Hemoglobin  32 pg  High  27-31  

 

 Mean Corpuscular HGB Conc  35 g/dL    31-36  

 

 Red Cell Distribution Width  12 %    10.5-15  

 

 Platelet Count  297 10^3/uL    150-450  

 

 Mean Platelet Volume  8 um3    7.4-10.4  

 

 Abs Neutrophils  5.3 10^3/uL    1.5-7.7  

 

 Abs Lymphocytes  2.2 10^3/uL    1.0-4.8  

 

 Abs Monocytes  0.4 10^3/uL    0-0.8  

 

 Abs Eosinophils  1.6 10^3/uL  High  0-0.6  

 

 Abs Basophils  0 10^3/uL    0-0.2  

 

 Abs Nucleated RBC  0.01 10^3/uL      









 Laboratory test finding  2014  [facility]  Serum Pregnancy  Negative    
Negative  11

 

 Manual Differential  2014  [facility]  Neutrophil %  57 %    38-83  









 Lymphocytes %  30 %    25-47  

 

 Monocytes %  3 %    0-13  

 

 Eosinophils %  10 %  High  0-6  

 

 RBC Morphology  Normal    Normal  









 Urinalysis  2014  [facility]  Urine Color  Yellow      









 Urine Appearance  Clear      

 

 Urine Specific Gravity  1.016    1.010-1.030  

 

 Urine Esterase  1+  Abnormal  Negative  

 

 Urine Nitrate  Negative    Negative  

 

 Urine Urobilinogen  Negative E.U./dL    Negative  

 

 Urine Protein  Negative mg/dL    Negative  

 

 Urine pH  7.5    5-9  

 

 Urine Blood  Negative    Negative  

 

 Urine Ketones  Negative mg/dL    Negative  

 

 Urine Bilirubin  Negative    Negative  

 

 Urine Glucose  Negative mg/dL    Negative  









 Urine Microscopic  2014  [facility]  Urine WBC  1+ (<10 /hpf)    None Seen  









 Urine RBC  None Seen    None Seen  

 

 Urine Epithelial Cells  2+ Squamous /hpf    None Seen  

 

 Bacteria Urine  None Seen    None Seen  









 Urine Culture And Sensitivities  2014  [facility]  Urine Culture  (SEE NOTE)   
   12

 

 Comp Metabolic Panel  2014  [facility]  Sodium  135 mmol/L    133-145  









 Potassium  3.9 mmol/L    3.7-5.6  

 

 Chloride  101 mmol/L    101-111  

 

 Co2 Carbon Dioxide  28 mmol/L    22-32  

 

 Anion Gap  6 mmol/L    2-11  

 

 Glucose  82 mg/dL      

 

 Blood Urea Nitrogen  18 mg/dL    6-24  

 

 Creatinine  0.88 mg/dL    0.51-0.95  

 

 BUN/Creatinine Ratio  20.5  High  8-20  

 

 Calcium  8.9 mg/dL    8.6-10.3  

 

 Total Protein  6.3 g/dL  Low  6.4-8.9  

 

 Albumin  3.8 g/dL    3.2-5.2  

 

 Globulin  2.5 g/dL    2-4  

 

 Albumin/Globulin Ratio  1.5    1-3  

 

 Total Bilirubin  0.20 mg/dL    0.2-1.0  

 

 Alkaline Phosphatase  62 U/L      

 

 Alt  12 U/L    7-52  

 

 Ast  15 U/L    13-39  

 

 Egfr Non-  75.4    >60  

 

 Egfr   97.0    >60  13









 Laboratory test finding  2014  [facility]  Amylase  57 U/L      









 Lipase  18 U/L    11.0-82.0  

 

 C Reactive Protein  5.38 mg/L  High  < 5.00  14









 CBC Electronic ([facility])  05/10/2013  [facility]  WBC  6.0    3.6-9.6  



     (607)-   -          









 RBC  4.30    3.90-5.70  

 

 Hemoglobin ([facility]ma/[facility])  13.6 g/dL    12.1 - 17.2  

 

 Hematocrit (a/[facility])  40.1 %    36.1 - 50.3  

 

 Platelets  263 10^3/ul    150-400  

 

 Lymph%  35.1    20.5-51.1  

 

 Mixed%  6.5      

 

 Neutrophils %  58.4      

 

 Mean Corpuscular Vol  93    82.2-97.4  

 

 Mean Corpuscular Hemoglobin  31.7    27.6-33.3  

 

 Mean Corpuscular Hemo Concen  33.9    32.0-36.0  

 

 RDW  11.8    11.6-13.7  

 

 Mean Platelet Volume  6.4  Low  6.5-11.0  









 Ua - Micro ([facility])  05/10/2013  [facility]  Appearance  CLEAR      



     (607)-   -          









 Color  YELLOW      

 

 Glucose  NEG      

 

 Bilirubin  NEG      

 

 Ketones  15MG/DL  #    

 

 SP Grav  1.025      

 

 Blood  SMALL  #    

 

 PH  6.0      

 

 Protein  NEG      

 

 Urobil  0.2      

 

 Nitrite  NEG      

 

 Leukocytes (a/[facility])  NEG      

 

 Hyaline  - /Lpf      

 

 Granular  - /Lpf      

 

 WBC ([facility])  1-2  #    

 

 RBC  5-8  #    

 

 Mucus  SM AMT /Lpf  #    

 

 Epith  MOD AMT /Lpf  #    

 

 Bacteria  1+ /Hpf  #    

 

 Amorphous  - /Lpf      

 

 Crystals, Fluid ([facility])  -      

 

 Z#Comments  SEE COMMENTS      15









 Laboratory test finding  2012  [facility](a)  TSH  1.57 mIU/L    
0.50-6.00  









 B12  546 pg/mL    230-1050  









 Laboratory test  2012  [facility]  Vitamin D, 25  41.1    30.0-100.0  16



 finding    [address]  [address]  ng/mL      



     [place], [place] [id number] [phone]          

 

 Celiac Disease  2012  [facility]  Deamidated  4 units    0-19  17



 Comp PNL    [address]  Gliadin Abs,        



     [place] [address]  IgA        



     [phone]          









 Deamidated Gliadin Abs, IgG  4 units    0-19  18

 

 t-Transglutaminase (tTG) IgA  <2 U/mL    0-3  19

 

 t-Transglutaminase (tTG) IgG  <2 U/mL    0-5  20

 

 Endomysial Antibody IgA  Negative    Negative  

 

 Immunoglobulin A, Qn, Serum  208 mg/dL      









 Ua - Non Micro ([facility])  2012  [facility]  Appearance  CLEAR      



     (607)-   -          









 Color  YELLOW      

 

 Glucose, Urine ([facility])  NEG      

 

 Bilirubin  NEG      

 

 Ketones  NEG      

 

 SP Grav  1.015      

 

 Blood  MODERATE  #  Menses  

 

 PH  7.0      

 

 Protein  NEG      

 

 Urobil  0.2      

 

 Nitrite  NEG      

 

 Leukocytes ([facility])  NEG      









 Comprehensive Metabolic  2012  [facility] Priya(fma)  Albumin  4.2 g/dL    
3.8-5.5  



 Prof              









 Alk. Phos.  57 U/L      

 

 Alt (SGPT)  25 U/L    7-35  

 

 Ast (Sgot)  23 U/L    5-34  

 

 BUN  16 mg/dL    6-26  

 

 Calcium  9.4 mg/dL    8.6-10.2  

 

 Chloride  97 mEq/L      

 

 Creatinine  1.0 mg/dL    0.6-1.4  

 

 Carbon Dioxide  25 mEq/L    21-32  

 

 Glucose  75 mg/dL      

 

 Sodium  135 mEq/L    134-149  

 

 Total Bilirubin  0.4 mg/dL    0.2-1.3  

 

 Total Protein  6.7 g/dL    6.3-8.1  

 

 Potassium  4.6 mEq/L    3.6-5.5  

 

 Globulin  2.5 g/dL    2.0-4.8  

 

 A/G Ratio  1.7 Calc    0.6-2.2  

 

 BUN/Creat Ratio  16.9 Calc    8.0-36.0  









 Lipid Profile  2012  Suresh Singh([facility])  Cholesterol  157 mg/dL    120-
200  









 HDL  43 mg/dL    30-85  

 

 Triglycerides  133 mg/dL      

 

 HDL Risk Factor  3.7 CALC    0.0-4.0  

 

 LDL (Calculated)  88 CALC    0-129  

 

 VLDL (Calculated)  27 mg/dL    0-50  









 Laboratory test finding  2012  Suresh Singh([facility])  TSH  1.40 mIU/L    
0.50-6.00  

 

 CBC Electronic ([facility])  2012  [facility]  WBC  6.5    3.6-9.6  



     (607)-   -          









 RBC  4.43    3.90-5.70  

 

 Hemoglobin ([facility])  13.7 g/dL    12.1 - 17.2  

 

 Hematocrit ([facility]ma/[facility])  43.1 %    36.1 - 50.3  

 

 Platelets  249 10^3/ul    150-400  

 

 Lymph%  23.8    20.5-51.1  

 

 Mixed%  6.2      

 

 Neutrophils %  70.0      

 

 Mean Corpuscular Vol  97    82.2-97.4  

 

 Mean Corpuscular Hemoglobin  31.0    27.6-33.3  

 

 Mean Corpuscular Hemo Concen  31.9  Low  32.0-36.0  

 

 RDW  13.2    11.6-13.7  

 

 Mean Platelet Volume  6.6    6.5-11.0  









 Comprehensive Metabolic  2010  Suresh Singh([facility])  Albumin  3.5 g/dL  
Low  3.8-5.5  21



 Prof              









 Alk. Phos.  60 U/L      

 

 Alt (SGPT)  8 U/L    7-35  

 

 Ast (Sgot)  12 U/L    5-34  

 

 BUN  11 mg/dL    6-26  

 

 Calcium  8.9 mg/dL    8.6-10.2  

 

 Chloride  100 mEq/L      

 

 Creatinine  0.6 mg/dL    0.6-1.4  

 

 Carbon Dioxide  24 mEq/L    21-32  

 

 Glucose  72 mg/dL      

 

 Sodium  140 mEq/L    134-149  

 

 Total Bilirubin  0.2 mg/dL    0.2-1.3  

 

 Total Protein  6.1 g/dL  Low  6.3-8.1  22

 

 Potassium  4.2 mEq/L    3.6-5.5  

 

 Globulin  2.6 g/dL    2.0-4.8  

 

 A/G Ratio  1.3 Calc    0.6-2.2  

 

 BUN/Creat Ratio  16.9 Calc    8.0-36.0  









 Laboratory test finding  2010  Suresh Priya(a)  Amylase  78 U/L    20
-105  









 TSH  1.45 mIU/L    0.50-6.00  









 Laboratory test  2010  [facility]  Lipase  44 U/L    1-64  23



 finding    [address] [id number] [phone]          

 

 Comprehensive  2009  Suresh Priya(a)  Albumin  4.3 g/dL    3.8-5.5  



 Metabolic Prof              









 Alk. Phos.  87 U/L      

 

 Alt (SGPT)  16 U/L    7-35  

 

 Ast (Sgot)  17 U/L    5-34  

 

 BUN  23 mg/dL    6-26  

 

 Calcium  9.1 mg/dL    8.6-10.2  

 

 Chloride  99 mEq/L      

 

 Creatinine  0.8 mg/dL    0.6-1.4  

 

 Carbon Dioxide  28 mEq/L    21-32  

 

 Glucose  90 mg/dL      

 

 Sodium  138 mEq/L    134-149  

 

 Total Bilirubin  0.3 mg/dL    0.2-1.3  

 

 Total Protein  6.9 g/dL    6.3-8.1  

 

 Potassium  4.6 mEq/L    3.6-5.5  

 

 Globulin  2.6 g/dL    2.0-4.8  

 

 A/G Ratio  1.7 Calc    0.6-2.2  

 

 BUN/Creat Ratio  26.6 Calc    8.0-36.0  









 Laboratory test finding  2009  Suresh Singh([facility])  TSH  0.89 mIU/L    
0.50-6.00  









 Free T4  0.97 ng/dL    0.75-1.54  









 1  [id number]  1 grey urine top transport tube

 

 2  SEE RESULT BELOW



   -----------------------------------------------------------------------------
---------------



   Name:  CHERI LOVELL LEROY              : 1985    Attend Dr: Doretha Alex MD



   Acct:  [id number]  Unit: [id number]  AGE: 33            Location:  [facility]



   Re18                        SEX: F             Status:    REG REF



   -----------------------------------------------------------------------------
---------------



   



   SPEC: [id number]         BARB:       [facility] DR: Doretha Alex MD



   REQ:  [id number]              RECD:   



   STATUS: COMP



   _



   SOURCE: URINE          SPDESC:



   ORDERED:  Urine Culture



   COMMENTS: [id number]



   1 grey urine top transport tube



   Urine Source: Random



   



   -----------------------------------------------------------------------------
---------------



   Procedure                         Result                         Reported   
        Site



   -----------------------------------------------------------------------------
---------------



   Urine Culture  Final                                             18-
1602      ML



   No Growth (<1,000 CFU/mL)



   



   -----------------------------------------------------------------------------
---------------



   * ML - Main Lab



   .



   



   



   



   



   



   



   



   



   



   



   



   



   



   



   



   



   



   



   



   



   



   



   



   ** END OF REPORT **



   



   DEPARTMENT OF PATHOLOGY,  [address] [place] [address]



   Phone # [phone]      Fax #[phone]



   [place] Carroll M.D. Director     [facility] # [id number]

 

 3  : [id number]

 

 4  [id number]

 

 5  SEE RESULT BELOW



   -----------------------------------------------------------------------------
---------------



   Name:  CHERI LOVELL              : 1985    Attend Dr: Charles Westbrook MD



   Acct:  [id number]  Unit: [id number]  AGE: 33            Location:  [facility]



   Re/10/18                        SEX: F             Status:    DEP ER



   -----------------------------------------------------------------------------
---------------



   



   SPEC: [id number]         BARB:   12/10/    ANIYAH DR: Jenny HOOVER



   REQ:  [id number]              RECD:   12/10/



   STATUS: NEO HERNANDEZ DR: Doretha Westbrook MD



   _



   SOURCE: URINE          SPDESC:



   ORDERED:  Urine Culture



   COMMENTS: [id number]



   



   -----------------------------------------------------------------------------
---------------



   Procedure                         Result                         Reported   
        Site



   -----------------------------------------------------------------------------
---------------



   Urine Culture  Final                                             18-
827      ML



   



   Organism 1                     ESCHERICHIA COLI



   [place] Count                >100,000 (Many) CFU/ML



   



   1. ESCHERICHIA COLI



   M.I.C.      RX



   --------- ------



   Ampicillin                     >=32        R



   Cefazolin                      <=4         S



   Cefepime                       <=1         S



   Ceftriaxone                    <=1         S



   Ciprofloxacin                  <=0.25      S



   Gentamicin                     >=16        R



   Levofloxacin                   <=0.12      S



   Meropenem                      <=0.25      S



   Nitrofurantoin                 <=16        S



   Tetracycline                   >=16        R



   Pipercillin/Tazobactam         <=4         S



   Trimethoprim/Sulfamethoxazole  >=320       R



   Amoxicillin/Clavulanic Acid    >=32        R



   Aztreonam                      <=1         S



   



   



   Contact the Microbiology Department for any additional



   antibiotic reporting.



   



   -----------------------------------------------------------------------------
---------------



   * ML - Main Lab



   .



   



   ** END OF REPORT **



   



   DEPARTMENT OF PATHOLOGY,  [address] [place] [address]



   Phone # [phone]      Fax #[phone]



   [place] Carroll M.D. Director     [facility] # [id number]

 

 6  : [id number]



   Test Disclaimer: Positive bacteria, red blood cells, white



   blood cells, early pregnancy, low specific gravity, and



   other factors may cause false positive or negative results.



   It is recommended to retest unexpected pregnancy results



   within 24 to 72 hours with a serum test when applicable.



   If pregnancy is still suspected, please repeat test after



   48 to 72 hours.

 

 7  1 sst

 

 8  RUN DATE: 14               [facility] **LIVE**        
        PAGE    1



   RUN TIME: 3728             [address] [place] [address]   [id number]



   Specimen Inquiry



   



   -----------------------------------------------------------------------------
---------------



   Name:  CHERI LOVELL                : 1985    Attend Dr: Neil Sofia MD



   Acct:  [id number]  Unit: [id number]  AGE: 28            Location:  ENDO



   Re14                        SEX: F             Status:    REG REF



   -----------------------------------------------------------------------------
---------------



   



   SPEC: [id number]             BARB: 14-          SUBM DR: Neil Sofia MD



   REQ:  [id number]             RECD: 2



   STATUS: DAVID HERNANDEZ DR: David Hills MD



   _



   ORDERED:  LEVEL IV



   



   FINAL DIAGNOSIS



   



   



   



   



   Duodenum, biopsy:



   A. Duodenal mucosa with no significant pathologic abnormalities.



   B. No evidence of villous blunting or increased intraepithelial lymphocytes.



   



   



   



   



   



   



   



   



   CLINICAL HISTORY



   



   Abdominal pain, nausea and vomiting for EGD



   



   POST-OPERATIVE DIAGNOSIS



   



   Esophagus - -normal, no erosion, stomach - normal, no ulcer or gastritis, 
duodenum - normal



   bulb to third portion (biopsied).  Normal EGD



   



   GROSS DESCRIPTION



   



   The specimen is received in formalin labeled Cheri [place], Biopsy 
Duodenum and consists



   of a 0.6 x 0.5 x 0.2 cm. aggregate of multiple, tan-pink, irregular, soft 
tissue fragments.



   Submitted entirely, one cassette.



   



   



   Signed __________(signature on file)___________ Betzaida Fisher MD  [id number]



   



   -----------------------------------------------------------------------------
---------------



   



   



   ** END OF REPORT **



   



   * ML=Testing performed at Main Lab



   DEPARTMENT OF PATHOLOGY,  [facility] [address]



   Phone # [phone]      Fax #[phone]



   [place] Carroll M.D. Director     [place] Permit  #[id number]

 

 9  RUN DATE: 14               [facility] **LIVE**        
        PAGE    1



   RUN TIME: 0734             [facility] [place], [place]   [address]



   Specimen Inquiry



   



   -----------------------------------------------------------------------------
---------------



   Name:  CHERI LOVELL                : 1985    Attend Dr: Neil Sofia MD



   Acct:  [id number]  Unit: [id number]  AGE: 28            Location:  ENDO



   Re14                        SEX: F             Status:    REG REF



   -----------------------------------------------------------------------------
---------------



   



   SPEC: [id number]         BARB:       [facility] DR: Neil Sofia MD



   REQ:  [id number]              RECD:   14-8



   STATUS: COMP             [facility] DR: David Hills MD



   _



   SOURCE: GAS ANTRUM     SPDESC:



   ORDERED:  Clotest



   



   -----------------------------------------------------------------------------
---------------



   Procedure                         Result                         Verified   
        Site



   -----------------------------------------------------------------------------
---------------



   Clotest  Final                                                   14-
734      ML



   Clotest                     Negative



   



   -----------------------------------------------------------------------------
---------------



   



   



   



   



   



   



   



   



   



   



   



   



   



   



   



   



   



   



   



   



   



   



   



   



   



   



   



   



   



   ** END OF REPORT **



   



   * ML=Testing performed at Main Lab



   DEPARTMENT OF PATHOLOGY,  [address] [place] [address]



   Phone # [phone]      Fax #[phone]



   [place] Carroll M.D. Director     [place] Permit  #[id number]

 

 10  1sst

 

 11  This test detects intact HCG only and is indicated for the



   early detection of pregnancy.

 

 12  RUN DATE: 14               [facility] **LIVE**       
         PAGE    1



   RUN TIME: 1205             [address] [place] [address]   [address]



   Specimen Inquiry



   



   -----------------------------------------------------------------------------
---------------



   Name:  CHERI LOVELL                : 1985    Attend Dr: Raymundo Aguirre DO



   Acct:  [id number]  Unit: [id number]  AGE: 28            Location:  ED



   Re14                        SEX: F             Status:    DEP ER



   -----------------------------------------------------------------------------
---------------



   



   SPEC: [id number]         BARB:       SUBM DR: Raymundo Aguirre DO



   REQ:  [id number]              RECD:   14-



   STATUS: NEO HERNANDEZ DR: David Hills MD



   _



   SOURCE: URINE          SPDESC:



   ORDERED:  Urine Culture



   



   -----------------------------------------------------------------------------
---------------



   Procedure                         Result                         Verified   
        Site



   -----------------------------------------------------------------------------
---------------



   Urine Culture  Final                                             14-
1205      ML



   



   Organism 1                     NORMAL PRIYA



   [place] Count                1-10,000 (Few) CFU/ML



   



   -----------------------------------------------------------------------------
---------------



   



   



   



   



   



   



   



   



   



   



   



   



   



   



   



   



   



   



   



   



   



   



   



   



   



   



   



   ** END OF REPORT **



   



   * ML=Testing performed at Main Lab



   DEPARTMENT OF PATHOLOGY,  [address] [place] [address]



   Phone # [phone]      Fax #[phone]



   [place] Carroll M.D. Director     [place] Permit  #[id number]

 

 13  *******Because ethnic data is not always readily available,



   this report includes an eGFR for both -Americans and



   non- Americans.****



   The National Kidney Disease Education Program (NKDEP) does



   not endorse the use of the MDRD equation for patients that



   are not between the ages of 18 and 70, are pregnant, have



   extremes of body size, muscle mass, or nutritional status,



   or are non- or non-.



   According to the National Kidney Foundation, irrespective of



   diagnosis, the stage of the disease is based on the level of



   kidney function:



   Stage Description                      GFR(mL/min/1.73 m(2))



   1     Kidney damage with normal or decreased GFR       90



   2     Kidney damage with mild decrease in GFR          60-89



   3     Moderate decrease in GFR                         30-59



   4     Severe decrease in GFR                           15-29



   5     Kidney failure                       <15 (or dialysis)

 

 14  Acute inflammation:  >10.00

 

 15  NOT GOOD CLEAN CATCH

 

 16  Vitamin D deficiency has been defined by the [place] of



   Medicine and an Endocrine Society practice guideline as a



   level of serum 25-OH vitamin D less than 20 ng/mL (1,2).



   The Endocrine Society went on to further define vitamin D



   insufficiency as a level between 21 and 29 ng/mL (2).



   1. IOM ([place] of [employer]). 2010. Dietary reference



      intakes for calcium and D. [place]: The



      National Academies Press.



   2. Kourtney MF, Suhas NC, Ben GILLILAND, et al.



      Evaluation, treatment, and prevention of vitamin D



      deficiency: an Endocrine Society clinical practice



      guideline. JCEM. 2011; 96(5):1911-30.

 

 17  Negative                   0 - 19



                      Weak Positive             20 - 30



                      Moderate to Strong Positive   >30

 

 18  Negative                   0 - 19



                      Weak Positive             20 - 30



                      Moderate to Strong Positive   >30

 

 19  Negative        0 -  3



                                 Weak Positive   4 - 10



                                 Positive           >10



                                                      .



    Tissue Transglutaminase (tTG) has been identified



    as the endomysial antigen.  Studies have demonstr-



    ated that endomysial IgA antibodies have over 99%



    specificity for gluten sensitive enteropathy.

 

 20  Negative        0 - 5



                                 Weak Positive   6 - 9



                                 Positive           >9

 

 21  result anselmo'd

 

 22  result reckd'

 

 23  1 SST







Procedures







 Date  Code  Description  Status

 

 2018  77014  Pulse Oximetry  Completed

 

 2017  54257235  Mammogram  Completed

 

 2014  86130  Pulse Oximetry  Completed

 

 2014  66136  Nebulizer Treatment  Completed

 

 2012  08027  Vision Test- screening test of visual acuity,  Completed



     quantitative, bila  

 

 2012  06552  Electrocardiogram Complete  Completed







Encounters







 Type  Date  Location  Provider  Dx  Diagnosis

 

 Office Visit  2019  Main Office  Destini Tariq  M54.5  Low back pain



   10:30a    DEBORA Rivero    

 

 Office Visit  2018  [facility] Office  Doretha Alex,  F33.9  Major 
depressive



   3:30p    M.D.    disorder,



           recurrent,



           unspecified









 S93.402A  Sprain of unspecified ligament of left ankle, init encntr









 Office Visit  2018  3:30p  [facility]  Brook HOFFMAN  J20.9  Acute bronchitis,



     Office  DEBORA Ponce    unspecified

 

 Office Visit  2017  9:00a  [facility]  Brook HOFFMAN  J20.9  Acute bronchitis,



     Office  DEBORA Ponce    unspecified

 

 Office Visit  2017 11:30a  [facility]  Brook HOFFMAN  M25.542  Pain in joints 
of



     Office  DEBORA Ponce    left hand









 M25.532  Pain in left wrist









 Office Visit  10/26/2017  2:40p  [facility] Office  Myriam BONE  F43.23  
Adjustment



       SARAH Noe.    disorder with



           mixed anxiety and



           depressed mood

 

 Office Visit  2017  3:00p  [facility] Office  Brook HOFFMAN  J01.00  Acute 
maxillary



       Kris, NP    sinusitis,



           unspecified









 J30.9  Allergic rhinitis, unspecified









 Office Visit  2016  1:50p  [facility] Office  Myriam BONE  J20.9  Acute 
bronchitis,



       MARKUS Noe    unspecified

 

 Office Visit  2016  4:45p  [facility] Office  Susu Barroso,  M54.5  Low 
back pain



       Afnp-C    









 K58.9  Irritable bowel syndrome without diarrhea









 Office Visit  2016 10:10a  Main Office  Morales GOMEZ  K13.29  Other 
disturbances



       MARKUS Hernandez    of oral epithelium,



           including tongue









 E55.9  Vitamin D deficiency, unspecified









 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



   10:45a  Office  Dharmesh,    gingivostomatitis and



       Afnp-C    pharyngotonsillitis

 

 Office Visit  2016  Main Office  Barbara  F41.8  Other specified anxiety



   4:15p    [place],    disorders



       Afnp-C    

 

 Office Visit  2016  [facility]  Doretha Alex,  J01.90  Acute sinusitis,



   11:00a  Office  M.D.    unspecified









 R63.5  Abnormal weight gain









 Office Visit  2015  3:15p  [facility] Office  Betzaida  311  Depressive



       Kathie, FNP    Disorder Not



           Elsewhere Spec









 300.00  Anxiety State Unspec









 Office Visit  2015  [facility]  Betzaida  465.0  Laryngopharyngitis



   1:45p  Office  Kathie, FNP    Acute









 786.2  Cough









 Office Visit  2015 11:30a  [facility]  Betzaida  009.3  Diarrhea



       Kathie, FNP    Presumed



           Infectious



           Origin

 

 Office Visit  2015 11:40a  [facility] Office  Kashmir Vasquez M.D.  488.82  
Influenza Due To



           Identified



           Influenza A



           Virus Resp



           Manfestat

 

 Office Visit  2014  1:45p  [facility] Office  Richa Mackenzie,  461.0  
Sinusitis Acute



       FNP    Maxillary

 

 Office Visit  2014  4:40p  [facility] Office  Kashmir Vasquez M.D.  300.00  
Anxiety State



           Unspec

 

 Office Visit  2014 10:00a  [facility] Office  Morales GOMEZ  599.0  UTI 
Urinary



       MARKUS Hernandez    Tract Infection



           Site Not Spec

 

 Office Visit  04/15/2014  4:20p  [facility]  Hernandez A.  789.00  Pain 
Abdominal



       MARKUS Hills    Unspec Site

 

 Office Visit  04/10/2014  4:10p  [facility]  David BRUNO.  789.00  Pain 
Abdominal



       MARKUS Hills    Unspec Site

 

 Office Visit  2014  4:00p  [facility]  David A.  789.00  Pain 
Abdominal



       MARKUS Hills    Unspec Site









 466.0  Bronchitis Acute









 Office Visit  2014 10:00a  [facility] Office  Betzaida Vázquez, FNP  
786.2  Cough









 466.0  Bronchitis Acute









 Office Visit  05/10/2013  [facility]  Susu Carmonaigne,  558.9  Gastroenteritis &



   4:00p  Office  Afnp-C    Colitis Noninfectious



           Other

 

 Office Visit  2012  [facility]  David CLEMENTE  296.31  Depressive Disorder



   9:40a  Office  MARKUS Hills    Major Recurrent Mild









 314.00  Attention Deficit Disorder W/O Mention Of Hyperactivity









 Office Visit  2012 10:40a  [facility] Office  David CLEMENTE  314.00  
Attention Deficit



       MARKUS Hills    Disorder W/O



           Mention Of



           Hyperactivity









 296.31  Depressive Disorder Major Recurrent Mild









 Office Visit  2012 11:15a  [facility] Office  Richa Mackenzie,  461.0  
Sinusitis Acute



       FNP    Maxillary

 

 Office Visit  2012  3:30p  [facility] Office  Anastasiya HACKETT  V70.0  Examination



       MARKUS Morin    General Medical



           Routine [facility]



           Care Facility









 789.00  Pain Abdominal Unspec Site

 

 268.9  Vitamin D Deficiency Unspec

 

 780.93  Memory Loss

 

 V72.0  Examination Eyes & Vision









 Office Visit  2012  3:30p  [facility]  Miracle TONY  786.50  Pain 
Chest



       MARKUS Meyer    Unspec

 

 Office Visit  2010  2:30p  Main Office  Richa Mackenzie,  789.00  Pain 
Abdominal



       FNP    Unspec Site

 

 Office Visit  2009  2:00p  [facility]  Richa Mackenzie  783.5  
Polydipsia



       FNP    







Plan of Treatment

2019 - Destini Rivero, NPM54.5 Low back painNew Xrays:MRI Lumbar 
Spine W/O Contrast, Ordered: 19Comments:Apply heat packs to affected 
area. Use acetaminophen/ibuprofen/aleve for pain as directed. Avoid heavy 
lifting. Do mary ann stretching but nothing strenuous. Sleep in a bed, avoid 
couches or recliners. Muscle relaxer: take at night. do not drink alcohol with 
this medication. Do no drive or operate heavy machinery with this medication 
due to its sedating effects. Return if condition worsens, fails to improve or 
if concerning features arise. consider steroid injections - will get MRI, 
consult with spinal specialistAllComments:Medication Management Patient 
Understands medications  he 's taking?     Yes    No  Are there Barriers to 
Adherence?    Yes    No  Has the patient been asked about herbal supplements 
and therapies, andOTC  meds?      Yes    No      Care Plan1.  Patient has been 
queried about patient's goals/preferences and functional/lifestyle goals at 
relevant visits.  If relevant, describe: na2.  Treatment goals as explained to 
the patient: above3.  Are there barriers to meeting treatment goals?  Yes    No
      If Yes, please describe:4.  Self-Management goals as described to the 
patient:  Yes     NoAs always, we strongly encourage a healthy diet and making 
physical activity a part of your every day life. If you have questions about 
how or where to start, please contact the office.